# Patient Record
Sex: MALE | Race: WHITE
[De-identification: names, ages, dates, MRNs, and addresses within clinical notes are randomized per-mention and may not be internally consistent; named-entity substitution may affect disease eponyms.]

---

## 2018-10-08 PROBLEM — Z00.00 ENCOUNTER FOR PREVENTIVE HEALTH EXAMINATION: Status: ACTIVE | Noted: 2018-10-08

## 2018-11-15 ENCOUNTER — APPOINTMENT (OUTPATIENT)
Dept: DERMATOLOGY | Facility: CLINIC | Age: 23
End: 2018-11-15
Payer: MEDICAID

## 2018-11-15 PROCEDURE — 99201 OFFICE OUTPATIENT NEW 10 MINUTES: CPT

## 2020-11-17 ENCOUNTER — EMERGENCY (EMERGENCY)
Facility: HOSPITAL | Age: 25
LOS: 1 days | Discharge: HOME | End: 2020-11-17
Attending: EMERGENCY MEDICINE
Payer: COMMERCIAL

## 2020-11-17 VITALS
HEART RATE: 77 BPM | SYSTOLIC BLOOD PRESSURE: 131 MMHG | TEMPERATURE: 100 F | RESPIRATION RATE: 18 BRPM | HEIGHT: 68 IN | WEIGHT: 139.99 LBS | OXYGEN SATURATION: 100 % | DIASTOLIC BLOOD PRESSURE: 84 MMHG

## 2020-11-17 PROCEDURE — T1013: CPT

## 2020-11-17 PROCEDURE — 99282 EMERGENCY DEPT VISIT SF MDM: CPT

## 2020-11-17 PROCEDURE — 99283 EMERGENCY DEPT VISIT LOW MDM: CPT

## 2020-11-17 RX ORDER — MORPHINE SULFATE 50 MG/1
6 CAPSULE, EXTENDED RELEASE ORAL ONCE
Refills: 0 | Status: DISCONTINUED | OUTPATIENT
Start: 2020-11-17 | End: 2020-11-17

## 2020-11-17 NOTE — ED PROVIDER NOTE - PATIENT PORTAL LINK FT
You can access the FollowMyHealth Patient Portal offered by [facility] by registering at the following website: [email or web address]. By joining [employer]’s FollowMyHealth portal, you will also be able to view your health information using other applications (apps) compatible with our system.

## 2020-11-17 NOTE — ED PROVIDER NOTE - NSFOLLOWUPINSTRUCTIONS_ED_ALL_ED_FT
Follow up with your GI doctor. Return to ER if worsening abdominal pain or persistent rectal bleeding.

## 2020-11-17 NOTE — ED ADULT TRIAGE NOTE - CHIEF COMPLAINT QUOTE
Pt presents to ED c/o hemorrhoids, states was seen by PMD saturday, "but today saw excess blood in the toilet"

## 2020-11-17 NOTE — ED PROVIDER NOTE - OBJECTIVE STATEMENT
24 y.o M with no  PMHx of presents to the ED with c/o hemorrhoids. Pt notes of bloody stool and his last PMD visit was 5 days ago where they gave him relief medication for the hemorrhoids. Pt does not have a hx of hemorrhoids until 5 days ago. Pt notes of an abnormal gait. Pt decided to come to the ED since they are worried with the amount of blood that was arising whenever pt went to the bathroom.       : Jyoti Costa

## 2020-11-17 NOTE — ED PROVIDER NOTE - CLINICAL SUMMARY MEDICAL DECISION MAKING FREE TEXT BOX
24 y.o M dx with hemorrhoids 5 days ago. Pt stable at this time, not actively bleeding, has GI follow up already, will recommend stool softener, sitz bath, and GI follow up.

## 2022-09-11 ENCOUNTER — EMERGENCY (EMERGENCY)
Facility: HOSPITAL | Age: 27
LOS: 1 days | Discharge: HOME | End: 2022-09-11
Attending: EMERGENCY MEDICINE
Payer: COMMERCIAL

## 2022-09-11 VITALS
SYSTOLIC BLOOD PRESSURE: 152 MMHG | HEART RATE: 73 BPM | TEMPERATURE: 98 F | OXYGEN SATURATION: 98 % | DIASTOLIC BLOOD PRESSURE: 85 MMHG | HEIGHT: 68 IN | RESPIRATION RATE: 18 BRPM

## 2022-09-11 VITALS
OXYGEN SATURATION: 98 % | RESPIRATION RATE: 19 BRPM | SYSTOLIC BLOOD PRESSURE: 145 MMHG | TEMPERATURE: 98 F | HEART RATE: 71 BPM | DIASTOLIC BLOOD PRESSURE: 79 MMHG

## 2022-09-11 PROCEDURE — 99282 EMERGENCY DEPT VISIT SF MDM: CPT

## 2022-09-11 PROCEDURE — 99283 EMERGENCY DEPT VISIT LOW MDM: CPT

## 2022-09-11 RX ORDER — FLUORESCEIN SODIUM 9 MG
1 STRIP OPHTHALMIC (EYE) ONCE
Refills: 0 | Status: COMPLETED | OUTPATIENT
Start: 2022-09-11 | End: 2022-09-11

## 2022-09-11 RX ORDER — OFLOXACIN 0.3 %
1 DROPS OPHTHALMIC (EYE)
Refills: 0 | Status: DISCONTINUED | OUTPATIENT
Start: 2022-09-11 | End: 2022-09-16

## 2022-09-11 RX ADMIN — Medication 1 APPLICATION(S): at 22:02

## 2022-09-11 RX ADMIN — Medication 1 DROP(S): at 22:31

## 2022-09-11 NOTE — ED PROVIDER NOTE - PHYSICAL EXAMINATION
Gen: Nontoxic, well appearing, in NAD.  Skin: Warm and dry as visualized.  Head: NC/AT.  Eyes: VA: 20/20, OS: 20/20, OD: 20/20. Sclera anicteric, conjunctivae clear b/l. +Left eye discharge. PERRLA, EOMI.   Wood's Lamp: No corneal abrasion. No Loren's Sign. No foreign body.  Neck: Supple, FROM. Trachea midline.   Resp: No distress.  Cardio: Well perfused.  Ext: No deformities. MAEx4. FROM.   Neuro: A&Ox3. Appears nonfocal.   Psych: Normal affect and mood.

## 2022-09-11 NOTE — ED PROVIDER NOTE - OBJECTIVE STATEMENT
25 yo male no PMHx presents to ED c/o right eye redness/tearing x2 weeks. Reports possibly getting something in eye, prescribed Raj-Poly-Vicky and Viscine by PCP. Has not followed up with ophthalmologist. No further complaints at this time.  Denies visual changes. 27 yo male no PMHx presents to ED c/o right eye redness/tearing x2 weeks. Reports possibly getting something in eye, prescribed Raj-Poly-Vicky and Viscine by PCP on 9/8. Has not followed up with ophthalmologist. No further complaints at this time.  Denies visual changes. 25 yo male no PMHx presents to ED c/o left eye redness/tearing x2 weeks. Reports possibly getting something in eye, prescribed Raj-Poly-Vicky and Viscine by PCP on 9/8. Has not followed up with ophthalmologist. No further complaints at this time.  Denies visual changes.

## 2022-09-11 NOTE — ED PROVIDER NOTE - PATIENT PORTAL LINK FT
You can access the FollowMyHealth Patient Portal offered by [facility] by registering at the following website: [email or web address]. By joining [employer]’s FollowMyHealth portal, you will also be able to view your health information using other applications (apps) compatible with our system.

## 2022-09-11 NOTE — ED PROVIDER NOTE - NSFOLLOWUPINSTRUCTIONS_ED_ALL_ED_FT
- Discontinue previous antibiotic drops. Begin Ciprofloxacin 1 drop to right eye 4x/day for 7 days.  - Clean eye with wet cotton ball.   - Please call today to schedule follow up appointment with ophthalmologist.  - Please bring all documentation from your ED visit to any related future follow up appointment.  - Please call to schedule follow up appointment with your primary care physician within 24-48 hours.  - Please seek immediate medical attention or return to the ED for any new/worsening, signs/symptoms, or concerns.    Feel better!     - Suspender las gotas antibióticas anteriores. Comience con ciprofloxacina 1 gota en el vanesa derecho 4 veces al día aaron 7 días.  - Limpie el vanesa con freddy sebastián de algodón húmeda.  - Llame veronicay para programar freddy elsie de seguimiento con el oftalmólogo.  - Traiga toda la documentación de fontanez visita al ED a cualquier elsie de seguimiento futura relacionada.  - Llame para programar freddy elsie de seguimiento con fontanez médico de atención primaria dentro de las 24 a 48 horas.  - Busque atención médica inmediata o regrese al servicio de urgencias por cualquier signo/síntoma o inquietud nuevos/empeorados.    ¡Sentirse mejor!    Deepti AMES  [phone]

## 2022-09-11 NOTE — ED PROVIDER NOTE - ATTENDING APP SHARED VISIT CONTRIBUTION OF CARE
I, Valente Jones, performed a face to face bedside interview with this patient regarding history of present illness, review of symptoms and relevant past medical, social and family history.  I completed an independent physical examination. I have communicated the patient’s plan of care and disposition with the ACP.  26 year old presents with eye redness x 2 weeks. no blurry vision, pain  Gen: NAD, well appearing  eye: EOMI, PERRLA, +R conjunctival injection  CV: RRR  Pul: CTA b/l  Abd: Soft, non-distended, non-tender  Neuro: no focal deficits  Pt improved, stable for dc and outpt mgt

## 2022-09-11 NOTE — ED ADULT TRIAGE NOTE - CHIEF COMPLAINT QUOTE
Presents to ED c/o redness and tearing to left eye. Pt states that he was doing some work 2 weeks ago when he thinks that something went into his eye. Pt was seen by PCP and prescribed Neomycin optic drops but reports the symptoms have not gotten better. States that it is worse in the morning, and it occasionally tears but that he can see out of it normally.

## 2022-09-11 NOTE — ED PROVIDER NOTE - CLINICAL SUMMARY MEDICAL DECISION MAKING FREE TEXT BOX
25 yo male no PMHx presents to ED c/o right eye redness/tearing x2 weeks. Prescribed Raj-Poly-Vicky on 9/8. Advised to discontinue, begin Ciprofloxacin. Optho follow up. Patient to be discharged. Patient and/or guardian provided verbal/written discharge instructions and return precautions.  Patient and/or guardian expresses understanding and agreement. 25 yo male no PMHx presents to ED c/o left eye redness/tearing x2 weeks. Prescribed Raj-Poly-Vicky on 9/8. Advised to discontinue, begin Ciprofloxacin. Optho follow up. Patient to be discharged. Patient and/or guardian provided verbal/written discharge instructions and return precautions.  Patient and/or guardian expresses understanding and agreement.

## 2022-09-13 ENCOUNTER — RX ONLY (RX ONLY)
Age: 27
End: 2022-09-13

## 2022-09-13 ENCOUNTER — OFFICE (OUTPATIENT)
Dept: URBAN - METROPOLITAN AREA CLINIC 94 | Facility: CLINIC | Age: 27
Setting detail: OPHTHALMOLOGY
End: 2022-09-13
Payer: COMMERCIAL

## 2022-09-13 DIAGNOSIS — H53.9: ICD-10-CM

## 2022-09-13 DIAGNOSIS — H10.9: ICD-10-CM

## 2022-09-13 PROCEDURE — 99213 OFFICE O/P EST LOW 20 MIN: CPT | Performed by: OPHTHALMOLOGY

## 2022-09-13 ASSESSMENT — REFRACTION_MANIFEST
OD_VA1: 20/20
OS_CYLINDER: -1.00
OS_SPHERE: PL
OD_SPHERE: PL
OS_VA1: 20/20
OS_AXIS: 180

## 2022-09-13 ASSESSMENT — REFRACTION_AUTOREFRACTION
OS_CYLINDER: -1.50
OD_CYLINDER: -0.25
OS_AXIS: 179
OD_SPHERE: -0.25
OS_SPHERE: +0.25
OD_AXIS: 042

## 2022-09-13 ASSESSMENT — SPHEQUIV_DERIVED
OS_SPHEQUIV: -0.5
OD_SPHEQUIV: -0.375

## 2022-09-13 ASSESSMENT — VISUAL ACUITY
OS_BCVA: 20/60
OD_BCVA: 20/25

## 2022-09-13 ASSESSMENT — KERATOMETRY
OD_AXISANGLE_DEGREES: 098
OS_K1POWER_DIOPTERS: 43.50
OD_K2POWER_DIOPTERS: 45.25
OS_K2POWER_DIOPTERS: 46.00
OD_K1POWER_DIOPTERS: 44.25
OS_AXISANGLE_DEGREES: 089

## 2022-09-13 ASSESSMENT — AXIALLENGTH_DERIVED
OD_AL: 23.283
OS_AL: 23.3305

## 2022-09-13 ASSESSMENT — TONOMETRY: OD_IOP_MMHG: 15

## 2022-09-13 ASSESSMENT — CONFRONTATIONAL VISUAL FIELD TEST (CVF)
OD_FINDINGS: FULL
OS_FINDINGS: FULL

## 2022-09-22 ENCOUNTER — RX ONLY (RX ONLY)
Age: 27
End: 2022-09-22

## 2022-09-22 ENCOUNTER — OFFICE (OUTPATIENT)
Dept: URBAN - METROPOLITAN AREA CLINIC 94 | Facility: CLINIC | Age: 27
Setting detail: OPHTHALMOLOGY
End: 2022-09-22
Payer: COMMERCIAL

## 2022-09-22 DIAGNOSIS — H18.623: ICD-10-CM

## 2022-09-22 DIAGNOSIS — H10.9: ICD-10-CM

## 2022-09-22 DIAGNOSIS — H53.9: ICD-10-CM

## 2022-09-22 PROCEDURE — 99213 OFFICE O/P EST LOW 20 MIN: CPT | Performed by: OPHTHALMOLOGY

## 2022-09-22 PROCEDURE — 92025 CPTRIZED CORNEAL TOPOGRAPHY: CPT | Performed by: OPHTHALMOLOGY

## 2022-09-22 ASSESSMENT — TONOMETRY
OD_IOP_MMHG: 13
OS_IOP_MMHG: 14

## 2022-09-22 ASSESSMENT — REFRACTION_MANIFEST
OS_VA1: 20/20
OS_AXIS: 180
OD_SPHERE: PL
OS_SPHERE: PL
OD_VA1: 20/20
OS_CYLINDER: -1.00

## 2022-09-22 ASSESSMENT — VISUAL ACUITY
OD_BCVA: 20/20-1
OS_BCVA: 20/50

## 2022-09-22 ASSESSMENT — REFRACTION_AUTOREFRACTION
OS_AXIS: 150
OS_SPHERE: +0.75
OS_CYLINDER: -2.00
OD_AXIS: 054
OD_SPHERE: +2.75
OD_CYLINDER: -3.50

## 2022-09-22 ASSESSMENT — AXIALLENGTH_DERIVED
OD_AL: 22.5607
OS_AL: 23.506

## 2022-09-22 ASSESSMENT — KERATOMETRY
OD_K1POWER_DIOPTERS: 43.00
OS_K2POWER_DIOPTERS: 44.50
OS_AXISANGLE_DEGREES: 041
OD_K2POWER_DIOPTERS: 47.75
OD_AXISANGLE_DEGREES: 154
OS_K1POWER_DIOPTERS: 43.50

## 2022-09-22 ASSESSMENT — SPHEQUIV_DERIVED
OD_SPHEQUIV: 1
OS_SPHEQUIV: -0.25

## 2022-10-13 ENCOUNTER — OFFICE (OUTPATIENT)
Dept: URBAN - METROPOLITAN AREA CLINIC 104 | Facility: CLINIC | Age: 27
Setting detail: OPHTHALMOLOGY
End: 2022-10-13
Payer: COMMERCIAL

## 2022-10-13 DIAGNOSIS — H53.9: ICD-10-CM

## 2022-10-13 DIAGNOSIS — H52.03: ICD-10-CM

## 2022-10-13 DIAGNOSIS — H18.623: ICD-10-CM

## 2022-10-13 PROBLEM — H10.9 CONJUNCTIVITIS, UNSPECIFIED: Status: RESOLVED | Noted: 2022-09-13 | Resolved: 2022-10-13

## 2022-10-13 PROCEDURE — 92014 COMPRE OPH EXAM EST PT 1/>: CPT | Performed by: OPHTHALMOLOGY

## 2022-10-13 PROCEDURE — 76514 ECHO EXAM OF EYE THICKNESS: CPT | Performed by: OPHTHALMOLOGY

## 2022-10-13 PROCEDURE — 92025 CPTRIZED CORNEAL TOPOGRAPHY: CPT | Performed by: OPHTHALMOLOGY

## 2022-10-13 ASSESSMENT — VISUAL ACUITY
OS_BCVA: 20/70
OD_BCVA: 20/25

## 2022-10-13 ASSESSMENT — REFRACTION_AUTOREFRACTION
OS_AXIS: 140
OD_SPHERE: +2.00
OS_CYLINDER: -1.50
OS_SPHERE: +1.00
OD_CYLINDER: -5.00
OD_AXIS: 061

## 2022-10-13 ASSESSMENT — SPHEQUIV_DERIVED
OD_SPHEQUIV: -0.5
OD_SPHEQUIV: 0.25
OS_SPHEQUIV: 0.25
OS_SPHEQUIV: 0.25

## 2022-10-13 ASSESSMENT — AXIALLENGTH_DERIVED
OS_AL: 23.316
OD_AL: 23.81
OS_AL: 23.316
OD_AL: 23.5168

## 2022-10-13 ASSESSMENT — REFRACTION_MANIFEST
OS_CYLINDER: -1.00
OS_VA1: 20/25
OD_CYLINDER: -4.50
OS_SPHERE: +0.75
OD_AXIS: 060
OD_VA1: 20/40
OS_AXIS: 140
OD_SPHERE: +2.50

## 2022-10-13 ASSESSMENT — KERATOMETRY
OD_K1POWER_DIOPTERS: 42.24
OD_K2POWER_DIOPTERS: 44.64
OS_K1POWER_DIOPTERS: 43.05
OD_AXISANGLE_DEGREES: 136
OS_AXISANGLE_DEGREES: 066
OS_K2POWER_DIOPTERS: 44.94

## 2022-10-13 ASSESSMENT — CONFRONTATIONAL VISUAL FIELD TEST (CVF)
OS_FINDINGS: FULL
OD_FINDINGS: FULL

## 2022-10-13 ASSESSMENT — PACHYMETRY
OD_CT_UM: 503
OS_CT_UM: 529
OS_CT_CORRECTION: 1
OD_CT_CORRECTION: 3

## 2022-10-13 ASSESSMENT — TONOMETRY
OS_IOP_MMHG: 10
OD_IOP_MMHG: 10

## 2022-12-21 ENCOUNTER — OFFICE (OUTPATIENT)
Dept: URBAN - METROPOLITAN AREA CLINIC 116 | Facility: CLINIC | Age: 27
Setting detail: OPHTHALMOLOGY
End: 2022-12-21
Payer: COMMERCIAL

## 2022-12-21 DIAGNOSIS — H18.623: ICD-10-CM

## 2022-12-21 PROCEDURE — 92014 COMPRE OPH EXAM EST PT 1/>: CPT | Performed by: OPTOMETRIST

## 2022-12-21 ASSESSMENT — TONOMETRY
OS_IOP_MMHG: 14
OD_IOP_MMHG: 14

## 2022-12-21 ASSESSMENT — PACHYMETRY
OS_CT_UM: 529
OD_CT_UM: 503
OD_CT_CORRECTION: 3
OS_CT_CORRECTION: 1

## 2022-12-21 ASSESSMENT — VISUAL ACUITY
OS_BCVA: 20/80
OD_BCVA: 20/25

## 2022-12-21 ASSESSMENT — KERATOMETRY
OS_K2POWER_DIOPTERS: 44.50
OD_K2POWER_DIOPTERS: 48.25
OD_AXISANGLE_DEGREES: 150
OD_K1POWER_DIOPTERS: 42.75
OS_AXISANGLE_DEGREES: 048
OS_K1POWER_DIOPTERS: 43.00

## 2022-12-21 ASSESSMENT — CONFRONTATIONAL VISUAL FIELD TEST (CVF)
OD_FINDINGS: FULL
OS_FINDINGS: FULL

## 2023-10-19 ENCOUNTER — APPOINTMENT (OUTPATIENT)
Dept: DERMATOLOGY | Facility: CLINIC | Age: 28
End: 2023-10-19
Payer: MEDICAID

## 2023-10-19 PROCEDURE — 99202 OFFICE O/P NEW SF 15 MIN: CPT | Mod: 25

## 2023-10-19 PROCEDURE — 11900 INJECT SKIN LESIONS </W 7: CPT

## 2023-11-20 ENCOUNTER — APPOINTMENT (OUTPATIENT)
Dept: DERMATOLOGY | Facility: CLINIC | Age: 28
End: 2023-11-20
Payer: MEDICAID

## 2023-11-20 PROCEDURE — 11900 INJECT SKIN LESIONS </W 7: CPT

## 2023-11-20 PROCEDURE — 99212 OFFICE O/P EST SF 10 MIN: CPT | Mod: 25

## 2024-01-05 ENCOUNTER — APPOINTMENT (OUTPATIENT)
Dept: DERMATOLOGY | Facility: CLINIC | Age: 29
End: 2024-01-05
Payer: MEDICAID

## 2024-01-05 ENCOUNTER — TRANSCRIPTION ENCOUNTER (OUTPATIENT)
Age: 29
End: 2024-01-05

## 2024-01-05 PROCEDURE — 11900 INJECT SKIN LESIONS </W 7: CPT

## 2024-01-13 ASSESSMENT — REFRACTION_MANIFEST
OD_CYLINDER: -4.50
OD_AXIS: 060
OS_SPHERE: +0.75
OD_AXIS: 060
OD_VA1: 20/40
OU_VA: 20/25
OD_SPHERE: +1.00
OS_VA1: 20/25
OS_AXIS: 150
OD_CYLINDER: -2.00
OS_VA1: 20/30PHNI
OS_CYLINDER: -0.50
OS_CYLINDER: -1.00
OS_SPHERE: PLANO
OS_AXIS: 140
OD_SPHERE: +2.50

## 2024-01-13 ASSESSMENT — KERATOMETRY
OD_K2POWER_DIOPTERS: 48.25
OS_K2POWER_DIOPTERS: 44.50
OS_K1POWER_DIOPTERS: 43.00
OD_K1POWER_DIOPTERS: 42.75
OD_AXISANGLE_DEGREES: 150
OS_AXISANGLE_DEGREES: 048

## 2024-01-13 ASSESSMENT — REFRACTION_AUTOREFRACTION
OD_SPHERE: +2.00
OD_CYLINDER: -3.25
OS_SPHERE: +0.75
OS_AXIS: 149
OS_CYLINDER: -1.50
OD_AXIS: 057

## 2024-01-13 ASSESSMENT — SPHEQUIV_DERIVED
OD_SPHEQUIV: 0
OD_SPHEQUIV: 0.375
OS_SPHEQUIV: 0
OD_SPHEQUIV: 0.25
OS_SPHEQUIV: 0.25

## 2024-01-13 ASSESSMENT — AXIALLENGTH_DERIVED
OD_AL: 22.7882
OD_AL: 22.8795
OD_AL: 22.7429
OS_AL: 23.5004
OS_AL: 23.4042

## 2024-03-22 ENCOUNTER — NON-APPOINTMENT (OUTPATIENT)
Age: 29
End: 2024-03-22

## 2024-03-25 ENCOUNTER — APPOINTMENT (OUTPATIENT)
Dept: DERMATOLOGY | Facility: CLINIC | Age: 29
End: 2024-03-25
Payer: MEDICAID

## 2024-03-25 PROCEDURE — 99213 OFFICE O/P EST LOW 20 MIN: CPT

## 2025-01-20 ENCOUNTER — OFFICE (OUTPATIENT)
Dept: URBAN - METROPOLITAN AREA CLINIC 116 | Facility: CLINIC | Age: 30
Setting detail: OPHTHALMOLOGY
End: 2025-01-20
Payer: MEDICAID

## 2025-01-20 DIAGNOSIS — H18.623: ICD-10-CM

## 2025-01-20 PROCEDURE — 92014 COMPRE OPH EXAM EST PT 1/>: CPT | Performed by: OPTOMETRIST

## 2025-01-20 ASSESSMENT — REFRACTION_MANIFEST
OD_CYLINDER: -2.00
OD_CYLINDER: -3.00
OD_AXIS: 060
OD_AXIS: 045
OS_AXIS: 150
OD_CYLINDER: -4.50
OS_CYLINDER: -1.00
OS_SPHERE: +0.75
OD_SPHERE: +1.00
OS_SPHERE: PLANO
OD_AXIS: 060
OD_SPHERE: +1.00
OS_AXIS: 150
OS_VA1: 20/25
OS_VA1: 20/30PHNI
OS_VA1: 20/25
OS_CYLINDER: -0.50
OD_VA1: 20/40
OD_VA1: 20/40
OS_AXIS: 140
OS_CYLINDER: -0.50
OD_SPHERE: +2.50
OU_VA: 20/25
OS_SPHERE: PLANO

## 2025-01-20 ASSESSMENT — REFRACTION_AUTOREFRACTION
OD_AXIS: 044
OS_AXIS: 154
OD_SPHERE: +2.50
OS_CYLINDER: -2.00
OD_CYLINDER: -4.00
OS_SPHERE: +1.25

## 2025-01-20 ASSESSMENT — KERATOMETRY
OS_K1POWER_DIOPTERS: 43.25
OS_K2POWER_DIOPTERS: 44.75
OD_AXISANGLE_DEGREES: 131
OD_K1POWER_DIOPTERS: 42.75
OD_K2POWER_DIOPTERS: 51.25
OS_AXISANGLE_DEGREES: 045

## 2025-01-20 ASSESSMENT — PACHYMETRY
OD_CT_UM: 503
OS_CT_CORRECTION: 1
OS_CT_UM: 529
OD_CT_CORRECTION: 3

## 2025-01-20 ASSESSMENT — CONFRONTATIONAL VISUAL FIELD TEST (CVF)
OS_FINDINGS: FULL
OD_FINDINGS: FULL

## 2025-01-20 ASSESSMENT — REFRACTION_CURRENTRX
OS_CYLINDER: -0.75
OD_OVR_VA: 20/
OS_VPRISM_DIRECTION: SV
OS_SPHERE: +0.50
OD_SPHERE: +1.00
OD_CYLINDER: -3.25
OS_OVR_VA: 20/
OD_AXIS: 041
OS_AXIS: 151
OD_VPRISM_DIRECTION: SV

## 2025-01-20 ASSESSMENT — VISUAL ACUITY
OS_BCVA: 20/80
OD_BCVA: 20/25

## 2025-01-20 ASSESSMENT — TONOMETRY
OS_IOP_MMHG: 13
OD_IOP_MMHG: 13